# Patient Record
Sex: FEMALE | Race: WHITE | NOT HISPANIC OR LATINO | Employment: UNEMPLOYED | URBAN - NONMETROPOLITAN AREA
[De-identification: names, ages, dates, MRNs, and addresses within clinical notes are randomized per-mention and may not be internally consistent; named-entity substitution may affect disease eponyms.]

---

## 2017-05-31 ENCOUNTER — TRANSCRIBE ORDERS (OUTPATIENT)
Dept: CARDIOLOGY | Facility: CLINIC | Age: 77
End: 2017-05-31

## 2017-05-31 DIAGNOSIS — I50.9 CONGESTIVE HEART FAILURE, UNSPECIFIED: Primary | ICD-10-CM

## 2017-06-01 ENCOUNTER — CONSULT (OUTPATIENT)
Dept: CARDIOLOGY | Facility: CLINIC | Age: 77
End: 2017-06-01

## 2017-06-01 VITALS
DIASTOLIC BLOOD PRESSURE: 90 MMHG | BODY MASS INDEX: 25.33 KG/M2 | HEIGHT: 65 IN | WEIGHT: 152 LBS | SYSTOLIC BLOOD PRESSURE: 178 MMHG | HEART RATE: 72 BPM

## 2017-06-01 DIAGNOSIS — R60.0 BILATERAL EDEMA OF LOWER EXTREMITY: ICD-10-CM

## 2017-06-01 DIAGNOSIS — R06.02 SHORTNESS OF BREATH: ICD-10-CM

## 2017-06-01 DIAGNOSIS — J44.9 CHRONIC OBSTRUCTIVE PULMONARY DISEASE, UNSPECIFIED COPD TYPE (HCC): ICD-10-CM

## 2017-06-01 DIAGNOSIS — I10 ESSENTIAL HYPERTENSION: Primary | ICD-10-CM

## 2017-06-01 DIAGNOSIS — I73.9 PAD (PERIPHERAL ARTERY DISEASE) (HCC): ICD-10-CM

## 2017-06-01 PROCEDURE — 99204 OFFICE O/P NEW MOD 45 MIN: CPT | Performed by: INTERNAL MEDICINE

## 2017-06-01 PROCEDURE — 93000 ELECTROCARDIOGRAM COMPLETE: CPT | Performed by: INTERNAL MEDICINE

## 2017-06-01 RX ORDER — HYDROCODONE BITARTRATE AND ACETAMINOPHEN 5; 325 MG/1; MG/1
1 TABLET ORAL EVERY 4 HOURS PRN
COMMUNITY

## 2017-06-01 RX ORDER — AMLODIPINE BESYLATE 10 MG/1
10 TABLET ORAL DAILY
COMMUNITY

## 2017-06-01 RX ORDER — TRAMADOL HYDROCHLORIDE 50 MG/1
50 TABLET ORAL EVERY 6 HOURS PRN
COMMUNITY

## 2017-06-01 RX ORDER — LEVOFLOXACIN 500 MG/1
500 TABLET, FILM COATED ORAL EVERY OTHER DAY
COMMUNITY

## 2017-06-01 RX ORDER — PREDNISONE 5 MG/1
1 TABLET ORAL DAILY
COMMUNITY

## 2017-06-01 RX ORDER — METOPROLOL TARTRATE 50 MG/1
50 TABLET, FILM COATED ORAL 2 TIMES DAILY
COMMUNITY

## 2017-06-01 RX ORDER — DIAZEPAM 10 MG/1
10 TABLET ORAL 3 TIMES DAILY
COMMUNITY

## 2017-06-01 RX ORDER — TRAZODONE HYDROCHLORIDE 100 MG/1
100 TABLET ORAL NIGHTLY
COMMUNITY

## 2017-06-01 RX ORDER — CLOPIDOGREL BISULFATE 75 MG/1
75 TABLET ORAL DAILY
COMMUNITY

## 2017-06-01 RX ORDER — BUDESONIDE AND FORMOTEROL FUMARATE DIHYDRATE 80; 4.5 UG/1; UG/1
2 AEROSOL RESPIRATORY (INHALATION)
COMMUNITY

## 2017-06-01 RX ORDER — IPRATROPIUM BROMIDE AND ALBUTEROL SULFATE 2.5; .5 MG/3ML; MG/3ML
3 SOLUTION RESPIRATORY (INHALATION) 4 TIMES DAILY
COMMUNITY

## 2017-06-01 NOTE — PROGRESS NOTES
CARDIAC COMPLAINTS  dyspnea      Subjective   Windy Merritt is a 76 y.o. female came in today for her initial cardiac evaluation.  She has history of hypertension, peripheral vascular disease, carotid artery disease who now has chronic renal failure.  She was admitted to the hospital a few weeks ago with increasing shortness of breath and found to have pneumonia.  She was treated with antibiotics.  During that time, it was noted her renal function as deteriorated.  She has refused dialysis.  She was treated with diuretics and was sent home.  She also was noted to have bilateral pleural effusion.  She saw Dr. Valencia recently, and now is getting high dose of Lasix.  She also has COPD for which she is on prednisone in tapering dose.  She is now referred for increasing shortness of breath.  She denies having any chest pain.  She denies having any leg pain.  She claims that she completely quit smoking now.    Past Surgical History:   Procedure Laterality Date   • CARDIAC CATHETERIZATION  01/20/2010    -(Brachial) 60% D1, 70% OM   • CARDIOVASCULAR STRESS TEST  04/30/2013    L. Myoview- Negative   • CAROTID ENDARTERECTOMY Left 03/08/2003    Dr.El Woody   • ECHO - CONVERTED  04/30/2013    EF 65%       Current Outpatient Prescriptions   Medication Sig Dispense Refill   • amLODIPine (NORVASC) 10 MG tablet Take 10 mg by mouth Daily.     • budesonide-formoterol (SYMBICORT) 80-4.5 MCG/ACT inhaler Inhale 2 puffs 2 (Two) Times a Day.     • clopidogrel (PLAVIX) 75 MG tablet Take 75 mg by mouth Daily.     • diazePAM (VALIUM) 10 MG tablet Take 10 mg by mouth 3 (Three) Times a Day.     • HYDROcodone-acetaminophen (NORCO) 5-325 MG per tablet Take 1 tablet by mouth Every 4 (Four) Hours As Needed.     • ipratropium-albuterol (DUO-NEB) 0.5-2.5 mg/mL nebulizer Take 3 mL by nebulization 4 (Four) Times a Day.     • levoFLOXacin (LEVAQUIN) 500 MG tablet Take 500 mg by mouth Every Other Day.     • metoprolol tartrate (LOPRESSOR)  50 MG tablet Take 50 mg by mouth 2 (Two) Times a Day.     • PredniSONE 5 MG (21) tablet therapy pack dosepak Take 1 each by mouth Daily. Take as directed on package instructions.     • traMADol (ULTRAM) 50 MG tablet Take 50 mg by mouth Every 6 (Six) Hours As Needed for Moderate Pain (4-6).     • traZODone (DESYREL) 100 MG tablet Take 100 mg by mouth Every Night.       No current facility-administered medications for this visit.            ALLERGIES:  Review of patient's allergies indicates no known allergies.    Past Medical History:   Diagnosis Date   • AAA (abdominal aortic aneurysm)    • Anemia    • Anxiety    • CAD (coronary artery disease)    • CHF (congestive heart failure)    • Chronic kidney disease    • COPD (chronic obstructive pulmonary disease)    • DJD (degenerative joint disease)    • Glaucoma    • H/O carotid endarterectomy     left   • H/O knee surgery     right   • History of hip surgery    • Hx of cholecystectomy    • Hypertension    • Lumbar disc disease    • PAD (peripheral artery disease)    • S/P JEREMY-BSO        History   Smoking Status   • Former Smoker   • Quit date: 2017   Smokeless Tobacco   • Never Used        Review of Systems   Constitution: Positive for weakness. Negative for decreased appetite and weight gain.   HENT: Negative for congestion and odynophagia.    Eyes: Negative for blurred vision and photophobia.   Cardiovascular: Positive for dyspnea on exertion and leg swelling. Negative for chest pain.   Respiratory: Negative for cough.    Skin: Negative for nail changes.   Musculoskeletal: Positive for arthritis and muscle cramps.   Gastrointestinal: Negative for constipation and diarrhea.   Genitourinary: Negative for bladder incontinence and dysuria.   Neurological: Positive for dizziness. Negative for aphonia.   Psychiatric/Behavioral: Negative for altered mental status. The patient does not have insomnia.        Diabetes- No  Thyroid- normal    Objective     /90 (BP  "Location: Right arm)  Pulse 72  Ht 65\" (165.1 cm)  Wt 152 lb (68.9 kg)  BMI 25.29 kg/m2    Physical Exam   Constitutional: She appears well-developed.   HENT:   Head: Normocephalic.   Eyes: Pupils are equal, round, and reactive to light.   Neck: Normal range of motion.   Cardiovascular: Normal rate.    Murmur heard.  Pulmonary/Chest: Effort normal and breath sounds normal.   Abdominal: Soft. Bowel sounds are normal.   Musculoskeletal: She exhibits edema.   Neurological: She is alert.   Skin: Skin is warm.   Psychiatric: She has a normal mood and affect.         ECG 12 Lead  Date/Time: 6/1/2017 12:36 PM  Performed by: VERONICA JOSE  Authorized by: VERONICA JOSE   Comparison: compared with previous ECG from 4/18/2013  Similar to previous ECG  Rhythm: sinus rhythm  Rate: normal  QRS axis: normal  Clinical impression: non-specific ECG              Assessment/Plan   Her heart rate is stable.  Blood pressure is moderately elevated.  She has not taken any of her blood pressure medicines today.  Her EKG showed sinus rhythm with nonspecific ST changes.  Her clinical examination reveals, that she still has bilateral pleural effusion.  Most of her problem seems to be, kidney related.  The leg edema could be combination of amlodipine as well as the renal failure.  Unfortunately, we don't have much alternative.  I encourage her to continue to refrain from smoking.  I scheduled her to undergo an echocardiogram to evaluate the LV function and also to rule out pericardial effusion.  If the proliferation continues to get bigger, she may need a pleural tap.  No changes were made to medication at this time.  Windy was seen today for establish care and shortness of breath.    Diagnoses and all orders for this visit:    Essential hypertension    Chronic obstructive pulmonary disease, unspecified COPD type  -     Adult Transthoracic Echo Complete; Future    Shortness of breath  -     Adult Transthoracic Echo Complete; " Future    Bilateral edema of lower extremity    PAD (peripheral artery disease)                    Electronically signed by Sal Tsang MD June 1, 2017 12:33 PM

## 2017-06-06 ENCOUNTER — HOSPITAL ENCOUNTER (OUTPATIENT)
Dept: CARDIOLOGY | Facility: HOSPITAL | Age: 77
Discharge: HOME | End: 2017-06-06
Attending: INTERNAL MEDICINE | Admitting: INTERNAL MEDICINE

## 2017-06-06 DIAGNOSIS — J44.9 CHRONIC OBSTRUCTIVE PULMONARY DISEASE, UNSPECIFIED COPD TYPE (HCC): ICD-10-CM

## 2017-06-06 DIAGNOSIS — R06.02 SHORTNESS OF BREATH: ICD-10-CM

## 2017-06-06 PROCEDURE — 93306 TTE W/DOPPLER COMPLETE: CPT | Performed by: INTERNAL MEDICINE

## 2017-06-06 PROCEDURE — 93306 TTE W/DOPPLER COMPLETE: CPT

## 2017-06-11 ENCOUNTER — OUTSIDE FACILITY SERVICE (OUTPATIENT)
Dept: CARDIOLOGY | Facility: CLINIC | Age: 77
End: 2017-06-11

## 2017-06-13 ENCOUNTER — TELEPHONE (OUTPATIENT)
Dept: CARDIOLOGY | Facility: CLINIC | Age: 77
End: 2017-06-13

## 2017-10-16 PROCEDURE — 93306 TTE W/DOPPLER COMPLETE: CPT | Performed by: INTERNAL MEDICINE

## 2017-10-17 ENCOUNTER — OUTSIDE FACILITY SERVICE (OUTPATIENT)
Dept: CARDIOLOGY | Facility: CLINIC | Age: 77
End: 2017-10-17